# Patient Record
Sex: MALE | Race: WHITE | NOT HISPANIC OR LATINO | Employment: OTHER | RURAL
[De-identification: names, ages, dates, MRNs, and addresses within clinical notes are randomized per-mention and may not be internally consistent; named-entity substitution may affect disease eponyms.]

---

## 2017-02-28 PROBLEM — K21.9 GERD (GASTROESOPHAGEAL REFLUX DISEASE): Status: ACTIVE | Noted: 2017-02-28

## 2017-02-28 PROBLEM — E66.9 OBESITY: Status: ACTIVE | Noted: 2017-02-28

## 2017-02-28 PROBLEM — M19.90 OSTEOARTHRITIS: Status: ACTIVE | Noted: 2017-02-28

## 2017-02-28 PROBLEM — R00.1 BRADYCARDIA: Status: ACTIVE | Noted: 2017-02-28

## 2017-02-28 PROBLEM — F41.9 ANXIETY: Status: ACTIVE | Noted: 2017-02-28

## 2017-02-28 PROBLEM — C67.9 BLADDER CANCER (HCC): Status: ACTIVE | Noted: 2017-02-28

## 2017-02-28 PROBLEM — C61 PROSTATE CANCER (HCC): Status: ACTIVE | Noted: 2017-02-28

## 2017-02-28 PROBLEM — I25.10 CAD (CORONARY ARTERY DISEASE): Status: ACTIVE | Noted: 2017-02-28

## 2017-02-28 PROBLEM — Z72.0 TOBACCO ABUSE: Status: ACTIVE | Noted: 2017-02-28

## 2017-02-28 PROBLEM — E78.5 DYSLIPIDEMIA: Status: ACTIVE | Noted: 2017-02-28

## 2017-02-28 PROBLEM — I10 HYPERTENSION: Status: ACTIVE | Noted: 2017-02-28

## 2017-05-09 ENCOUNTER — OFFICE VISIT (OUTPATIENT)
Dept: CARDIOLOGY | Facility: CLINIC | Age: 77
End: 2017-05-09

## 2017-05-09 VITALS
HEIGHT: 73 IN | DIASTOLIC BLOOD PRESSURE: 91 MMHG | WEIGHT: 215 LBS | BODY MASS INDEX: 28.49 KG/M2 | HEART RATE: 79 BPM | SYSTOLIC BLOOD PRESSURE: 161 MMHG

## 2017-05-09 DIAGNOSIS — R00.1 BRADYCARDIA: ICD-10-CM

## 2017-05-09 DIAGNOSIS — I10 ESSENTIAL HYPERTENSION: ICD-10-CM

## 2017-05-09 DIAGNOSIS — I25.810 CORONARY ARTERY DISEASE INVOLVING CORONARY BYPASS GRAFT OF NATIVE HEART WITHOUT ANGINA PECTORIS: Primary | ICD-10-CM

## 2017-05-09 DIAGNOSIS — E78.5 DYSLIPIDEMIA: ICD-10-CM

## 2017-05-09 PROCEDURE — 99212 OFFICE O/P EST SF 10 MIN: CPT | Performed by: INTERNAL MEDICINE

## 2017-05-09 RX ORDER — RAMIPRIL 10 MG/1
10 CAPSULE ORAL 2 TIMES DAILY
COMMUNITY
End: 2017-07-18 | Stop reason: SDUPTHER

## 2017-05-09 RX ORDER — POTASSIUM CITRATE 10 MEQ/1
10 TABLET, EXTENDED RELEASE ORAL DAILY
COMMUNITY

## 2017-05-09 RX ORDER — AMLODIPINE BESYLATE 5 MG/1
5 TABLET ORAL DAILY
COMMUNITY

## 2017-05-09 RX ORDER — DIAZEPAM 10 MG/1
10 TABLET ORAL EVERY 8 HOURS PRN
COMMUNITY
End: 2018-06-12

## 2017-05-09 RX ORDER — CHLORTHALIDONE 50 MG/1
50 TABLET ORAL DAILY
Qty: 30 TABLET | Refills: 11 | Status: SHIPPED | OUTPATIENT
Start: 2017-05-09 | End: 2018-06-12

## 2017-05-09 RX ORDER — ASPIRIN 325 MG
325 TABLET, DELAYED RELEASE (ENTERIC COATED) ORAL DAILY
COMMUNITY

## 2017-05-09 RX ORDER — METOPROLOL SUCCINATE 50 MG/1
25 TABLET, EXTENDED RELEASE ORAL AS NEEDED
COMMUNITY

## 2017-05-09 RX ORDER — ROSUVASTATIN CALCIUM 20 MG/1
20 TABLET, COATED ORAL NIGHTLY
COMMUNITY

## 2017-07-18 RX ORDER — RAMIPRIL 10 MG/1
CAPSULE ORAL
Qty: 180 CAPSULE | Refills: 3 | Status: SHIPPED | OUTPATIENT
Start: 2017-07-18 | End: 2019-07-09

## 2018-06-12 ENCOUNTER — OFFICE VISIT (OUTPATIENT)
Dept: CARDIOLOGY | Facility: CLINIC | Age: 78
End: 2018-06-12

## 2018-06-12 VITALS
WEIGHT: 217 LBS | BODY MASS INDEX: 28.76 KG/M2 | SYSTOLIC BLOOD PRESSURE: 93 MMHG | HEART RATE: 83 BPM | DIASTOLIC BLOOD PRESSURE: 57 MMHG | HEIGHT: 73 IN

## 2018-06-12 DIAGNOSIS — E78.5 DYSLIPIDEMIA: ICD-10-CM

## 2018-06-12 DIAGNOSIS — I25.810 CORONARY ARTERY DISEASE INVOLVING CORONARY BYPASS GRAFT OF NATIVE HEART WITHOUT ANGINA PECTORIS: Primary | ICD-10-CM

## 2018-06-12 DIAGNOSIS — I10 ESSENTIAL HYPERTENSION: ICD-10-CM

## 2018-06-12 PROCEDURE — 99214 OFFICE O/P EST MOD 30 MIN: CPT | Performed by: INTERNAL MEDICINE

## 2018-06-12 RX ORDER — ESCITALOPRAM OXALATE 10 MG/1
10 TABLET ORAL DAILY
COMMUNITY

## 2018-06-12 RX ORDER — METFORMIN HYDROCHLORIDE 500 MG/1
500 TABLET, EXTENDED RELEASE ORAL
COMMUNITY

## 2018-06-12 NOTE — PROGRESS NOTES
OFFICE FOLLOW UP     Date of Encounter:2018     Name: Kris Corral  : 1940  Address: [address] [facility] [place] [place] [address]  Phone: [phone]    PCP: Charlene Gandhi MD  [address] [place] [address]   [place] [address]    Kris Corral is a 77 y.o. male.      Chief Complaint: Follow up of CAD, HTN, HLP    Problem List:   1. Coronary artery disease:   a. CABG x3, in circa , (data deficit).   b. History of myocardial infarction.   c. Redo coronary artery bypass graft surgery x3 in , [facility], [place], Adriana Hines, (data deficit).    d. Left heart catheterization, 2011, [facility], ejection fraction 70% to 75%. 50% in-stent restenosis of the LAD, 100% proximal circumflex, right coronary artery 100% occluded. Graft angiography showing vein graft to the second marginal. Patent, but diffusely diseased. Saphenous vein graft to PDA, ectatic. Saphenous vein graft to distal circumflex, 100% occluded, LIMA to LAD atretic.   e. Echocardiogram, 2015: Normal ejection fraction, aortic sclerosis without stenosis.   2. Bradycardia, secondary to beta-blockers, resolved.    3. Hypertension.   4. Dyslipidemia.   5. Oral tobacco abuse.   6. Obesity. BMI 30.   7. History of acute kidney injury.   8. Probable chronic obstructive pulmonary disease.   9. Gastroesophageal reflux disease.    10. Anxiety.   11. Osteoarthritis.   12. Prostate cancer.   13. Bladder cancer.   14. Surgical history:   a. Cholecystectomy.   b. Exploratory laparotomy.     Allergies   Allergen Reactions   • Beta Adrenergic Blockers      symptomatic bradycardia at even very low doses     Current Medications:  •  amLODIPine 5 mg by mouth Daily  •  aspirin  MG by mouth Daily.  •  escitalopram 5 mg by mouth Daily.   •  metFORMIN  mg by mouth Daily With Breakfast.  •  metoprolol succinate XL 25 mg by mouth 2 (Two) Times a Day.  •  potassium citrate 10 mEq by mouth Daily.  •  Ramipril 10 MG BY MOUTH TWO TIMES A  "DAY FOR BLOOD PRESSURE  •  rosuvastatin 20 mg by mouth Every Night    History of Present Illness:  Mr. Corral returns for routine follow-up this afternoon.  He is active.  He remains asymptomatic, specifically denying any symptoms of angina, congestive heart failure, or any symptoms suggesting neurovascular or peripheral vascular disease.              The following portions of the patient's history were reviewed and updated as appropriate: allergies, current medications and problem list.    ROS: Pertinent positives as listed in the HPI.  All other systems reviewed and negative.    Objective:    Vitals:    06/12/18 1449 06/12/18 1502   BP: 101/66 93/57   BP Location: Left arm Left arm   Patient Position: Sitting Standing   Pulse: 75 83   Weight: 98.4 kg (217 lb)    Height: 185.4 cm (73\")        Physical Exam:  GENERAL: Alert, cooperative, in no acute distress.   HEENT: Fundoscopic deferred, otherwise unremarkable.  NECK: No Jugular venous distention, adenopathy, or thyromegaly noted.   HEART: Regular rhythm, normal rate, and no murmurs, gallops, or rubs.   LUNGS: Clear to auscultation bilaterally. No wheezing, rales or ronchi.  ABDOMEN: Flat without evidence of organomegaly, masses, or tenderness.  NEUROLOGIC: No focal abnormalities involving strength or sensation are noted.   EXTREMITIES: No clubbing, cyanosis, or edema noted.     Diagnostic Data:  No new labs available to review.    Procedures      Assessment and Plan:   1.  CAD: Asymptomatic on best medical treatment.  2.  HTN: At current AHA target.  No change in medications indicated.  3.  HLD:  Follow-up with Charlene Gandhi M.D. to target LDL cholesterol of less than 70.  We are available as needed in this regard.    I will see Kris Corral back in one year or sooner on an as needed basis.    I, Raghu Keen MD, [facility], [facility], personally performed the services described in this documentation as scribed by the above named individual in my presence, and it " is both accurate and complete. At 4:55 PM on 06/12/2018      Scribed for Raghu Keen MD by Ela Benz RN. 06/12/2018 3:26 PM.        EMR Dragon/Transcription Disclaimer:  Much of this encounter note is an electronic transcription/translation of spoken language to printed text.  The electronic translation of spoken language may permit erroneous, or at times, nonsensical words or phrases to be inadvertently transcribed.  Although I have reviewed the note for such errors, some may still exist.

## 2019-07-09 ENCOUNTER — OFFICE VISIT (OUTPATIENT)
Dept: CARDIOLOGY | Facility: CLINIC | Age: 79
End: 2019-07-09

## 2019-07-09 VITALS
WEIGHT: 216 LBS | HEART RATE: 81 BPM | BODY MASS INDEX: 28.63 KG/M2 | DIASTOLIC BLOOD PRESSURE: 76 MMHG | HEIGHT: 73 IN | SYSTOLIC BLOOD PRESSURE: 136 MMHG

## 2019-07-09 DIAGNOSIS — I10 ESSENTIAL HYPERTENSION: ICD-10-CM

## 2019-07-09 DIAGNOSIS — Z72.0 TOBACCO ABUSE: ICD-10-CM

## 2019-07-09 DIAGNOSIS — E78.5 DYSLIPIDEMIA: ICD-10-CM

## 2019-07-09 DIAGNOSIS — I25.810 CORONARY ARTERY DISEASE INVOLVING CORONARY BYPASS GRAFT OF NATIVE HEART WITHOUT ANGINA PECTORIS: Primary | ICD-10-CM

## 2019-07-09 PROCEDURE — 99214 OFFICE O/P EST MOD 30 MIN: CPT | Performed by: INTERNAL MEDICINE

## 2019-07-09 RX ORDER — RAMIPRIL 10 MG/1
10 CAPSULE ORAL 2 TIMES DAILY
Qty: 60 CAPSULE | Refills: 11 | Status: SHIPPED | OUTPATIENT
Start: 2019-07-09

## 2019-07-09 RX ORDER — RAMIPRIL 10 MG/1
10 CAPSULE ORAL DAILY
COMMUNITY
End: 2019-07-09 | Stop reason: SDUPTHER

## 2019-07-09 NOTE — PROGRESS NOTES
OFFICE FOLLOW UP     Date of Encounter:2019     Name: Kris Corral  : 1940  Address: [address] [facility] [place] [address]    PCP: Charlene Gandhi MD  [address] [facility]   [place] [address]    Kris Corral is a 78 y.o. male.    Chief Complaint: Follow up of CAD, HTN    Problem List:   1. Coronary artery disease:   a. CABG x3, in circa , (data deficit).   b. History of myocardial infarction.   c. Redo coronary artery bypass graft surgery x3 in , [facility], [place], Adriana Hines, (data deficit).    d. Left heart catheterization, 2011, [facility], ejection fraction 70% to 75%. 50% in-stent restenosis of the LAD, 100% proximal circumflex, right coronary artery 100% occluded. Graft angiography showing vein graft to the second marginal. Patent, but diffusely diseased. Saphenous vein graft to PDA, ectatic. Saphenous vein graft to distal circumflex, 100% occluded, LIMA to LAD atretic.   e. Echocardiogram, 2015: Normal ejection fraction, aortic sclerosis without stenosis.   2. Bradycardia, secondary to beta-blockers, resolved.    3. Hypertension.   4. Dyslipidemia.   5. Oral tobacco abuse.   6. Obesity. BMI 30.   7. History of acute kidney injury.   8. Probable chronic obstructive pulmonary disease.   9. Gastroesophageal reflux disease.    10. Anxiety.   11. Osteoarthritis.   12. Prostate cancer.   13. Bladder cancer.   14. Surgical history:   a. Cholecystectomy.   b. Exploratory laparotomy.     Allergies:  Allergies   Allergen Reactions   • Beta Adrenergic Blockers      symptomatic bradycardia at even very low doses   • Adhesive Tape Rash     Current Medications:  •  amLODIPine (NORVASC) 5 MG tablet, Take 5 mg by mouth Daily  •  aspirin  MG tablet, Take 325 mg by mouth Daily.  •  escitalopram (LEXAPRO) 10 MG tablet, Take 10 mg by mouth Daily  •  metFORMIN ER (GLUCOPHAGE-XR) 500 MG 24 hr tablet, Take 500 mg by mouth Daily With Breakfast.  •  metoprolol succinate  "XL (TOPROL-XL) 50 MG 24 hr tablet, Take 25 mg by mouth As Needed   •  potassium citrate (UROCIT-K) 10 MEQ (1080 MG) CR tablet, Take 10 mEq by mouth Daily  •  ramipril (ALTACE) 10 MG capsule, once daily   •  rosuvastatin (CRESTOR) 20 MG tablet, Take 20 mg by mouth Every Night.    History of Present Illness:           Mr. Corral returns for yearly follow up. He denies any chest pain or angina, however continues to have exertional dyspnea. He complains of shortness of breath with walking to his mailbox and back which is about 1/10 of a mile away. He is very sedentary and denies any rest dyspnea or nocturnal dyspnea. He continues to chew tobacco. Blood pressure is elevated today, and he reports he discontinued his nighttime Ramipril dose due to low blood pressures with systolic in the low 100's mmHg. He has not been checking his blood pressure since this change a few months ago.     The following portions of the patient's history were reviewed and updated as appropriate: allergies, current medications and problem list.    ROS: Pertinent positives as listed in the HPI.  All other systems reviewed and negative.    Objective:  Vitals:    07/09/19 1315 07/09/19 1317   BP: 147/82 136/76   BP Location: Left arm Left arm   Patient Position: Sitting Standing   Pulse: 76 81   Weight: 98 kg (216 lb)    Height: 185.4 cm (73\")      Physical Exam:  GENERAL: Alert, cooperative, in no acute distress.   HEENT: Normocephalic, no adenopathy, no jugular venous distention  HEART: No discrete PMI is noted. Regular rhythm, normal rate, and no murmurs, gallops, or rubs.   LUNGS: Clear to auscultation bilaterally. No wheezing, rales or ronchi.  ABDOMEN: Soft, bowel sounds present, non-tender   NEUROLOGIC: No focal abnormalities involving strength or sensation are noted.   EXTREMITIES: No clubbing, cyanosis, or edema noted.     Diagnostic Data:    No new labs available    Procedures      Assessment and Plan:     1. CAD: asymptomatic without " "angina. He has chronic exertional dyspnea which he reports is stable and likely has a pulmonary component to it. He is to continue ASA and statin.  2. HTN: mildly elevated today. He recently decreased his Ramipril dose due to \"borderline\" asymptomatic hypotension. We have recommended he go back on Ramipril 10mg BID.   3. HLD: Continue Crestor to target LDL <70, followed by Dr. Gandhi.   4. Tobacco abuse: encouraged cessation.   5. Follow up in 1 year or sooner as needed.     Scribed for Raghu Keen MD by Cornelia Hdez PA-C. 7/9/2019  1:45 PM                 "